# Patient Record
Sex: FEMALE | Race: WHITE | URBAN - METROPOLITAN AREA
[De-identification: names, ages, dates, MRNs, and addresses within clinical notes are randomized per-mention and may not be internally consistent; named-entity substitution may affect disease eponyms.]

---

## 2019-09-28 ENCOUNTER — OFFICE VISIT (OUTPATIENT)
Dept: ORTHOPEDIC SURGERY | Age: 39
End: 2019-09-28
Payer: COMMERCIAL

## 2019-09-28 VITALS — HEIGHT: 64 IN | WEIGHT: 179 LBS | BODY MASS INDEX: 30.56 KG/M2

## 2019-09-28 DIAGNOSIS — M79.644 FINGER PAIN, RIGHT: Primary | ICD-10-CM

## 2019-09-28 DIAGNOSIS — S63.641A SPRAIN OF METACARPOPHALANGEAL (MCP) JOINT OF RIGHT THUMB, INITIAL ENCOUNTER: ICD-10-CM

## 2019-09-28 DIAGNOSIS — S60.011A CONTUSION OF RIGHT THUMB WITHOUT DAMAGE TO NAIL, INITIAL ENCOUNTER: ICD-10-CM

## 2019-09-28 PROCEDURE — L3908 WHO COCK-UP NONMOLDE PRE OTS: HCPCS | Performed by: NURSE PRACTITIONER

## 2019-09-28 PROCEDURE — 99203 OFFICE O/P NEW LOW 30 MIN: CPT | Performed by: NURSE PRACTITIONER

## 2019-09-28 NOTE — PROGRESS NOTES
Subjective    Chief Complaint   Patient presents with    Hand Pain     [address] yesterday; pn at the base of the R thumb       Kaylie Cody presents today for evaluation of pain in her right hand, thumb. The patient tripped over something in the garage and fell on an outstretched hand yesterday. Since the initial injury, the patient reports her pain has improved. She did use ice and ibuprofen yesterday which did help. She denies any numbness or tingling. She points to the region of the thenar eminence and distal radius is a source of her pain. She is right-handed. 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 Pain Assessment  Location of Pain: Hand  Location Modifiers: Right  Severity of Pain: 8  Quality of Pain: Dull, Aching  Duration of Pain: Persistent  Frequency of Pain: Constant  Date Pain First Started: 09/27/19  Aggravating Factors: Other (Comment)(gripping)  Limiting Behavior: Some  Relieving Factors: Rest, Ice, Nsaids  Result of Injury: Yes  Work-Related Injury: No  Are there other pain locations you wish to document?: No    Patient's medications, allergies, past medical, surgical, social and family histories were reviewed and updated as appropriate. Physical Exam  Constitutional:  Pt well groomed, no acute distress, well developed, no obvious deformities  Vitals:    09/28/19 1011   Weight: 179 lb (81.2 kg)   Height: 5' 4\" (1.626 m)     -Oriented to person, place, and time  -mood and affect are appropriate    EXAM: Right hand: Pain over thenar eminence.    -There is not deformity  -There  is ecchymosis  -There is swelling. -ROM: Range of motion of thumb limited due to pain, though intact.  strength 3/5. Full range of motion of wrist.  Patient able to touch thumb to digits 2 and 3, difficult to get to 4 and 5.     Contralateral Exam:  -No obvious deformities  -No abrasions or cellulitis noted, NVI   -Full ROM   -No joint laxity  -no palpable tenderness noted    Neurological:   -There is not any complaints of numbness and tingling.    -Motor and sensory is at median, radial and ulnar nerve distributions. -NVI to upper extremities bilaterally. Skin:  No abrasions, lesions, cellulitic changes, obvious deformities noted     Xray:  3 view (AP/Lat/Oblique) of right finger show: No acute fractures or deformity; concern for fracture of the trapezium, only viewed on 1 of the views. Assessment: Right thumb contusion, right thumb sprain    Plan: The patient was placed in a thumb spica brace. She was encouraged to elevate, rest and ice her hand. She will continue with over-the-counter anti-inflammatories as needed for the pain. She may continue use of this hand. She will follow-up with our hand team in 7 to 10 days. Procedures    [employer] Wrist and Thumb Brace     Patient was prescribed a [employer] Titan Wrist and Thumb Brace. The right wrist and thumb will require stabilization / immobilization from this semi-rigid / rigid orthosis to improve their function. The orthosis will assist in protecting the affected area, provide functional support and facilitate healing. The patient was educated and fit by a healthcare professional with expert knowledge and specialization in brace application while under the direct supervision of the treating physician. Verbal and written instructions for the use of and application of this item were provided. They were instructed to contact the office immediately should the brace result in increased pain, decreased sensation, increased swelling or worsening of the condition.